# Patient Record
Sex: MALE | Race: WHITE | NOT HISPANIC OR LATINO | URBAN - METROPOLITAN AREA
[De-identification: names, ages, dates, MRNs, and addresses within clinical notes are randomized per-mention and may not be internally consistent; named-entity substitution may affect disease eponyms.]

---

## 2022-04-30 ENCOUNTER — TELEPHONE ENCOUNTER (OUTPATIENT)
Dept: URBAN - METROPOLITAN AREA CLINIC 121 | Facility: CLINIC | Age: 62
End: 2022-04-30

## 2022-05-01 ENCOUNTER — TELEPHONE ENCOUNTER (OUTPATIENT)
Dept: URBAN - METROPOLITAN AREA CLINIC 121 | Facility: CLINIC | Age: 62
End: 2022-05-01

## 2022-05-01 RX ORDER — ELECTROLYTES/DEXTROSE
SOLUTION, ORAL ORAL
Status: ACTIVE | COMMUNITY
Start: 2019-02-12

## 2022-05-01 RX ORDER — POLYETHYLENE GLYCOL-3350 AND ELECTROLYTES WITH FLAVOR PACK 240; 5.84; 2.98; 6.72; 22.72 G/278.26G; G/278.26G; G/278.26G; G/278.26G; G/278.26G
MIX AND USE AS DIRECTED POWDER, FOR SOLUTION ORAL
Status: ACTIVE | COMMUNITY
Start: 2019-02-12

## 2022-05-01 RX ORDER — TESTOSTERONE
POWDER (GRAM) MISCELLANEOUS
Status: ACTIVE | COMMUNITY
Start: 2019-02-12

## 2024-02-29 ENCOUNTER — OV NP (OUTPATIENT)
Dept: URBAN - METROPOLITAN AREA CLINIC 27 | Facility: CLINIC | Age: 64
End: 2024-02-29

## 2024-02-29 VITALS
RESPIRATION RATE: 17 BRPM | HEART RATE: 88 BPM | DIASTOLIC BLOOD PRESSURE: 95 MMHG | WEIGHT: 243 LBS | HEIGHT: 73 IN | BODY MASS INDEX: 32.2 KG/M2 | SYSTOLIC BLOOD PRESSURE: 152 MMHG

## 2024-02-29 RX ORDER — ELECTROLYTES/DEXTROSE
SOLUTION, ORAL ORAL
Status: ON HOLD | COMMUNITY
Start: 2019-02-12

## 2024-02-29 RX ORDER — POLYETHYLENE GLYCOL-3350 AND ELECTROLYTES WITH FLAVOR PACK 240; 5.84; 2.98; 6.72; 22.72 G/278.26G; G/278.26G; G/278.26G; G/278.26G; G/278.26G
MIX AND USE AS DIRECTED POWDER, FOR SOLUTION ORAL
Status: ON HOLD | COMMUNITY
Start: 2019-02-12

## 2024-02-29 RX ORDER — TESTOSTERONE
POWDER (GRAM) MISCELLANEOUS
Status: ON HOLD | COMMUNITY
Start: 2019-02-12

## 2024-02-29 NOTE — HPI-TODAY'S VISIT:
Patient here self-referred for evaluation of elevated liver enzymes that were first noted per routine labs in mid-January.  His AST was 64 and his ALT was 174.  The remainder of his liver enzymes were normal aside from a GGT of 128.  He denies any prior history of elevated liver enzymes, though he has not had his liver enzymes checked for at least 3 to 4 years.  A recheck of his liver enzymes on February 22 showed: , ALT 1880, .  Labs were rechecked on February 26; his liver enzymes had significantly improved: AST 77, , alk phos 156.  Viral hepatitis serologies showed negative hepatitis B surface antigen, negative hepatitis C antibody, negative hepatitis A total antibody.  His hepatitis A IgM antibody was not checked.  His ammonia level was normal.  He has not had any jaundice, pale stools or dark urine.  He apparently was on an antibiotic for "1 day" in January for a skin infection.  He does not know the name of this antibiotic.  He denies specific constitutional symptoms and states, "I just felt out of sorts" but cannot be any more specific than that.  He denies any recent sick contacts.  He did have he did drink an excessive amount of alcohol during the New Year's Eve holiday, and again 3 weeks ago, but has not had any alcohol since then.  He typically does not drink alcohol to excess.  He had a CT abdomen with contrast earlier this week which was unremarkable.  He does take "a lot of supplements" but stopped taking all of these 3d ago.  He has no GI symptoms aside from occasional reflux symptoms approximately 2-3 times per week.  He does not take any medication for this.  He is adherent to an antireflux regimen.  He has gained a significant amount of weight (35 pounds) over the past year but subsequently lost 30 pounds last month.  He has since regained approximately 10 pounds.  His son currently has ?mono, though he has not had any contact with his son in approximately 3 weeks.  There is no family history of colon cancer or polyps.  His sister apparently has a history of elevated liver enzymes of ?etiology.  His maternal grandmother had pancreatic cancer, and his maternal grandfather had gastric cancer.  His last colonoscopy was in 2019; no polyps.  He does have mild anxiety.

## 2024-03-29 ENCOUNTER — OV NP (OUTPATIENT)
Dept: URBAN - METROPOLITAN AREA CLINIC 86 | Facility: CLINIC | Age: 64
End: 2024-03-29
Payer: COMMERCIAL

## 2024-03-29 ENCOUNTER — LAB (OUTPATIENT)
Dept: URBAN - METROPOLITAN AREA CLINIC 86 | Facility: CLINIC | Age: 64
End: 2024-03-29

## 2024-03-29 VITALS
WEIGHT: 238 LBS | BODY MASS INDEX: 31.54 KG/M2 | TEMPERATURE: 97.2 F | DIASTOLIC BLOOD PRESSURE: 94 MMHG | HEART RATE: 84 BPM | HEIGHT: 73 IN | SYSTOLIC BLOOD PRESSURE: 139 MMHG

## 2024-03-29 DIAGNOSIS — R94.5 LIVER ENZYMES ABNORMAL: ICD-10-CM

## 2024-03-29 DIAGNOSIS — F41.9 ANXIETY: ICD-10-CM

## 2024-03-29 DIAGNOSIS — Z71.85 VACCINE COUNSELING: ICD-10-CM

## 2024-03-29 PROBLEM — 443913008: Status: ACTIVE | Noted: 2024-03-29

## 2024-03-29 PROCEDURE — 99214 OFFICE O/P EST MOD 30 MIN: CPT | Performed by: PHYSICIAN ASSISTANT

## 2024-03-29 RX ORDER — POLYETHYLENE GLYCOL-3350 AND ELECTROLYTES WITH FLAVOR PACK 240; 5.84; 2.98; 6.72; 22.72 G/278.26G; G/278.26G; G/278.26G; G/278.26G; G/278.26G
MIX AND USE AS DIRECTED POWDER, FOR SOLUTION ORAL
Status: ON HOLD | COMMUNITY
Start: 2019-02-12

## 2024-03-29 RX ORDER — ELECTROLYTES/DEXTROSE
SOLUTION, ORAL ORAL
Status: ON HOLD | COMMUNITY
Start: 2019-02-12

## 2024-03-29 RX ORDER — TESTOSTERONE
POWDER (GRAM) MISCELLANEOUS
Status: ON HOLD | COMMUNITY
Start: 2019-02-12

## 2024-03-29 NOTE — HPI-TODAY'S VISIT:
This is a 63-year-old male, who was previously seen by Dr. Arsh Arguello who presents today for evaluation of elevated liver enzymes. Recap of note with GI Patient here self-referred for evaluation of elevated liver enzymes that were first noted per routine labs in mid-January. His AST was 64 and his ALT was 174. The remainder of his liver enzymes were normal aside from a GGT of 128. He denies any prior history of elevated liver enzymes, though he has not had his liver enzymes checked for at least 3 to 4 years. A recheck of his liver enzymes on February 22 showed: , ALT 1880, . Labs were rechecked on February 26; his liver enzymes had significantly improved: AST 77, , alk phos 156. Viral hepatitis serologies showed negative hepatitis B surface antigen, negative hepatitis C antibody, negative hepatitis A total antibody. His hepatitis A IgM antibody was not checked. His ammonia level was normal. He has not had any jaundice, pale stools or dark urine. He apparently was on an antibiotic for "1 day" in January for a skin infection. He does not know the name of this antibiotic. He denies specific constitutional symptoms and states, "I just felt out of sorts" but cannot be any more specific than that. He denies any recent sick contacts. He did have he did drink an excessive amount of alcohol during the New Year's Eve holiday, and again 3 weeks ago, but has not had any alcohol since then. He typically does not drink alcohol to excess. He had a CT abdomen with contrast earlier this week which was unremarkable. He does take "a lot of supplements" but stopped taking all of these 3d ago. He has no GI symptoms aside from occasional reflux symptoms approximately 2-3 times per week. He does not take any medication for this. He is adherent to an antireflux regimen. He has gained a significant amount of weight (35 pounds) over the past year but subsequently lost 30 pounds last month. He has since regained approximately 10 pounds. His son currently has ?mono, though he has not had any contact with his son in approximately 3 weeks. There is no family history of colon cancer or polyps. His sister apparently has a history of elevated liver enzymes of ?etiology. His maternal grandmother had pancreatic cancer, and his maternal grandfather had gastric cancer. His last colonoscopy was in 2019; no polyps. He does have mild anxiety. February 29 labs with YOLANDA negative, ASMA negative, EBV testing negative, vitamin B-12 up at 11,003.  Iron studies were normal.  Alpha 1 antitrypsin level 95 , ferritin 141, bilirubin 0.4, alkaline phosphatase 104, AST 28, .  3/22/24 labs with ggt 517, ast 322, alt 698  Previously he was taking a lot of supplements( all herbs) stopped all on 2/26/24 nutrofol mens with  biotin 3000 mcg, ashwaganda extract, curcumin, zinc, vitamin c, tumeric extract, saw palmetto,  Brain x supplement which Ashwagandha 330 mg prostate supplement-prostate now with green tea extract 100 mg, ashwaganda, zinc , tumeric 100 mg  he was taking a tinnitus suppelments vitamin c, niacin, green tea extract 15 mg,   Asked what changed as it seemed labs were improving off the suppplements but back up on 3/22/24 He notes 2 weeks ago he did have some advil and he did take some last night, 800 mg . also still doing cbd vape and  could affect the labs  Will get MRI to make sure CT not missing anything. needs to avoid all hepatotoxins and advil as could have led to bump in the labs weekly labs and update labs today and get alpha 1 phenotype and vaccine screen

## 2024-04-01 ENCOUNTER — LAB (OUTPATIENT)
Dept: URBAN - METROPOLITAN AREA CLINIC 86 | Facility: CLINIC | Age: 64
End: 2024-04-01

## 2024-04-08 ENCOUNTER — LAB (OUTPATIENT)
Dept: URBAN - METROPOLITAN AREA CLINIC 86 | Facility: CLINIC | Age: 64
End: 2024-04-08

## 2024-04-19 ENCOUNTER — LAB (OUTPATIENT)
Dept: URBAN - METROPOLITAN AREA CLINIC 86 | Facility: CLINIC | Age: 64
End: 2024-04-19

## 2024-04-21 PROBLEM — 722223000: Status: ACTIVE | Noted: 2024-04-21

## 2024-04-21 PROBLEM — 162864005: Status: ACTIVE | Noted: 2024-04-21

## 2024-04-21 PROBLEM — 74883004: Status: ACTIVE | Noted: 2024-04-21

## 2024-04-21 PROBLEM — 453861000124107: Status: ACTIVE | Noted: 2024-04-21

## 2024-04-23 ENCOUNTER — OV EP (OUTPATIENT)
Dept: URBAN - METROPOLITAN AREA CLINIC 86 | Facility: CLINIC | Age: 64
End: 2024-04-23

## 2024-04-23 RX ORDER — ELECTROLYTES/DEXTROSE
SOLUTION, ORAL ORAL
Status: ON HOLD | COMMUNITY
Start: 2019-02-12

## 2024-04-23 RX ORDER — TESTOSTERONE
POWDER (GRAM) MISCELLANEOUS
Status: ON HOLD | COMMUNITY
Start: 2019-02-12

## 2024-04-23 RX ORDER — POLYETHYLENE GLYCOL-3350 AND ELECTROLYTES WITH FLAVOR PACK 240; 5.84; 2.98; 6.72; 22.72 G/278.26G; G/278.26G; G/278.26G; G/278.26G; G/278.26G
MIX AND USE AS DIRECTED POWDER, FOR SOLUTION ORAL
Status: ON HOLD | COMMUNITY
Start: 2019-02-12